# Patient Record
Sex: FEMALE | Race: BLACK OR AFRICAN AMERICAN | Employment: UNEMPLOYED | URBAN - METROPOLITAN AREA
[De-identification: names, ages, dates, MRNs, and addresses within clinical notes are randomized per-mention and may not be internally consistent; named-entity substitution may affect disease eponyms.]

---

## 2022-03-14 ENCOUNTER — OFFICE VISIT (OUTPATIENT)
Dept: HEMATOLOGY | Age: 67
End: 2022-03-14

## 2022-03-14 ENCOUNTER — HOSPITAL ENCOUNTER (OUTPATIENT)
Dept: LAB | Age: 67
Discharge: HOME | End: 2022-03-14
Payer: MEDICARE

## 2022-03-14 VITALS
BODY MASS INDEX: 13.71 KG/M2 | OXYGEN SATURATION: 98 % | HEART RATE: 87 BPM | HEIGHT: 62 IN | TEMPERATURE: 97.8 F | WEIGHT: 74.5 LBS | SYSTOLIC BLOOD PRESSURE: 190 MMHG | DIASTOLIC BLOOD PRESSURE: 87 MMHG

## 2022-03-14 DIAGNOSIS — Z11.59 ENCOUNTER FOR SCREENING FOR OTHER VIRAL DISEASES: ICD-10-CM

## 2022-03-14 DIAGNOSIS — R76.8 HEPATITIS C ANTIBODY POSITIVE IN BLOOD: ICD-10-CM

## 2022-03-14 DIAGNOSIS — K76.9 LIVER DISEASE, UNSPECIFIED: ICD-10-CM

## 2022-03-14 DIAGNOSIS — R76.8 HEPATITIS C ANTIBODY POSITIVE IN BLOOD: Primary | ICD-10-CM

## 2022-03-14 PROBLEM — I10 HTN (HYPERTENSION): Status: ACTIVE | Noted: 2022-03-14

## 2022-03-14 PROBLEM — E11.9 DIABETES MELLITUS TYPE 2, CONTROLLED (HCC): Status: ACTIVE | Noted: 2022-03-14

## 2022-03-14 PROBLEM — B18.2 HEP C W/O COMA, CHRONIC (HCC): Status: RESOLVED | Noted: 2022-03-14 | Resolved: 2022-03-14

## 2022-03-14 PROBLEM — B18.2 HEP C W/O COMA, CHRONIC (HCC): Status: ACTIVE | Noted: 2022-03-14

## 2022-03-14 LAB
ALBUMIN SERPL-MCNC: 3.3 G/DL (ref 3.4–5)
ALBUMIN/GLOB SERPL: 0.7 {RATIO} (ref 0.8–1.7)
ALP SERPL-CCNC: 138 U/L (ref 45–117)
ALT SERPL-CCNC: 105 U/L (ref 13–56)
ANION GAP SERPL CALC-SCNC: 6 MMOL/L (ref 3–18)
AST SERPL-CCNC: 58 U/L (ref 10–38)
BASOPHILS # BLD: 0 K/UL (ref 0–0.1)
BASOPHILS NFR BLD: 1 % (ref 0–2)
BILIRUB DIRECT SERPL-MCNC: 0.3 MG/DL (ref 0–0.2)
BILIRUB SERPL-MCNC: 0.5 MG/DL (ref 0.2–1)
BUN SERPL-MCNC: 11 MG/DL (ref 7–18)
BUN/CREAT SERPL: 19 (ref 12–20)
CALCIUM SERPL-MCNC: 8.7 MG/DL (ref 8.5–10.1)
CHLORIDE SERPL-SCNC: 100 MMOL/L (ref 100–111)
CO2 SERPL-SCNC: 27 MMOL/L (ref 21–32)
CREAT SERPL-MCNC: 0.57 MG/DL (ref 0.6–1.3)
DIFFERENTIAL METHOD BLD: ABNORMAL
EOSINOPHIL # BLD: 0 K/UL (ref 0–0.4)
EOSINOPHIL NFR BLD: 0 % (ref 0–5)
ERYTHROCYTE [DISTWIDTH] IN BLOOD BY AUTOMATED COUNT: 12.3 % (ref 11.6–14.5)
GLOBULIN SER CALC-MCNC: 4.6 G/DL (ref 2–4)
GLUCOSE SERPL-MCNC: 338 MG/DL (ref 74–99)
HCT VFR BLD AUTO: 38.6 % (ref 35–45)
HGB BLD-MCNC: 12.9 G/DL (ref 12–16)
IMM GRANULOCYTES # BLD AUTO: 0 K/UL (ref 0–0.04)
IMM GRANULOCYTES NFR BLD AUTO: 0 % (ref 0–0.5)
INR PPP: 1 (ref 0.8–1.2)
LYMPHOCYTES # BLD: 1.6 K/UL (ref 0.9–3.6)
LYMPHOCYTES NFR BLD: 41 % (ref 21–52)
MCH RBC QN AUTO: 30.4 PG (ref 24–34)
MCHC RBC AUTO-ENTMCNC: 33.4 G/DL (ref 31–37)
MCV RBC AUTO: 90.8 FL (ref 78–100)
MONOCYTES # BLD: 0.2 K/UL (ref 0.05–1.2)
MONOCYTES NFR BLD: 6 % (ref 3–10)
NEUTS SEG # BLD: 2 K/UL (ref 1.8–8)
NEUTS SEG NFR BLD: 52 % (ref 40–73)
NRBC # BLD: 0 K/UL (ref 0–0.01)
NRBC BLD-RTO: 0 PER 100 WBC
PLATELET # BLD AUTO: 238 K/UL (ref 135–420)
PMV BLD AUTO: 10.3 FL (ref 9.2–11.8)
POTASSIUM SERPL-SCNC: 3.8 MMOL/L (ref 3.5–5.5)
PROT SERPL-MCNC: 7.9 G/DL (ref 6.4–8.2)
PROTHROMBIN TIME: 12.4 SEC (ref 11.5–15.2)
RBC # BLD AUTO: 4.25 M/UL (ref 4.2–5.3)
SODIUM SERPL-SCNC: 133 MMOL/L (ref 136–145)
WBC # BLD AUTO: 3.8 K/UL (ref 4.6–13.2)

## 2022-03-14 PROCEDURE — 1101F PT FALLS ASSESS-DOCD LE1/YR: CPT | Performed by: NURSE PRACTITIONER

## 2022-03-14 PROCEDURE — 85610 PROTHROMBIN TIME: CPT

## 2022-03-14 PROCEDURE — 87902 NFCT AGT GNTYP ALYS HEP C: CPT

## 2022-03-14 PROCEDURE — G8432 DEP SCR NOT DOC, RNG: HCPCS | Performed by: NURSE PRACTITIONER

## 2022-03-14 PROCEDURE — G8536 NO DOC ELDER MAL SCRN: HCPCS | Performed by: NURSE PRACTITIONER

## 2022-03-14 PROCEDURE — G8753 SYS BP > OR = 140: HCPCS | Performed by: NURSE PRACTITIONER

## 2022-03-14 PROCEDURE — 82107 ALPHA-FETOPROTEIN L3: CPT

## 2022-03-14 PROCEDURE — 36415 COLL VENOUS BLD VENIPUNCTURE: CPT

## 2022-03-14 PROCEDURE — 85025 COMPLETE CBC W/AUTO DIFF WBC: CPT

## 2022-03-14 PROCEDURE — 86708 HEPATITIS A ANTIBODY: CPT

## 2022-03-14 PROCEDURE — G8754 DIAS BP LESS 90: HCPCS | Performed by: NURSE PRACTITIONER

## 2022-03-14 PROCEDURE — 80048 BASIC METABOLIC PNL TOTAL CA: CPT

## 2022-03-14 PROCEDURE — G8400 PT W/DXA NO RESULTS DOC: HCPCS | Performed by: NURSE PRACTITIONER

## 2022-03-14 PROCEDURE — 99204 OFFICE O/P NEW MOD 45 MIN: CPT | Performed by: NURSE PRACTITIONER

## 2022-03-14 PROCEDURE — G8427 DOCREV CUR MEDS BY ELIG CLIN: HCPCS | Performed by: NURSE PRACTITIONER

## 2022-03-14 PROCEDURE — 87521 HEPATITIS C PROBE&RVRS TRNSC: CPT

## 2022-03-14 PROCEDURE — 80076 HEPATIC FUNCTION PANEL: CPT

## 2022-03-14 PROCEDURE — 87389 HIV-1 AG W/HIV-1&-2 AB AG IA: CPT

## 2022-03-14 PROCEDURE — 1090F PRES/ABSN URINE INCON ASSESS: CPT | Performed by: NURSE PRACTITIONER

## 2022-03-14 PROCEDURE — G8419 CALC BMI OUT NRM PARAM NOF/U: HCPCS | Performed by: NURSE PRACTITIONER

## 2022-03-14 PROCEDURE — 86706 HEP B SURFACE ANTIBODY: CPT

## 2022-03-14 PROCEDURE — 86704 HEP B CORE ANTIBODY TOTAL: CPT

## 2022-03-14 PROCEDURE — 3017F COLORECTAL CA SCREEN DOC REV: CPT | Performed by: NURSE PRACTITIONER

## 2022-03-14 RX ORDER — PROPRANOLOL HYDROCHLORIDE 10 MG/1
10 TABLET ORAL 3 TIMES DAILY
COMMUNITY
End: 2022-08-11

## 2022-03-14 RX ORDER — INSULIN LISPRO 100 [IU]/ML
INJECTION, SOLUTION INTRAVENOUS; SUBCUTANEOUS
COMMUNITY

## 2022-03-14 RX ORDER — LISINOPRIL 5 MG/1
TABLET ORAL DAILY
COMMUNITY

## 2022-03-14 NOTE — PROGRESS NOTES
[address]40 [facility], MD, [address], [place], [place]      JOSE Ruiz, [facility]     Merle Peterson, [facility]   JOHN Bradshaw, [facility]       Meli Otero [address]    at [address], [address] [address]  22.    [phone]    FAX: [address] [place] [address] Avenue    at [address], [address]    [phone]    FAX: [phone]         Patient Care Team:  Jamal Graff MD as PCP - General (Family Medicine)  Jamal Graff MD as PCP - Health System Attribution Model (Family Medicine)      Problem List  Date Reviewed: 3/14/2022          Codes Class Noted    HTN (hypertension) ICD-10-CM: I10  ICD-9-CM: 401.9  3/14/2022        Diabetes mellitus type 2, controlled ([facility] [address] ICD-10-CM: E11.9  ICD-9-CM: 250.00  3/14/2022        Hepatitis C antibody positive in blood ICD-10-CM: R76.8  ICD-9-CM: 795.79  3/14/2022                The clinicians listed above have asked me to see Roopa Pringle in consultation regarding chronic HCV and its management. No medical records were available for review when the patient was here for the appointment. The patient is a 75 y.o. Black female who was screened for anti-HCV and tested positive during birth cohort screening in 03/2022. 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 There was no history of acute icteric hepatitis at the time of these risk factors. Imaging of the liver was either not performed or the results are not available to me. An assessment of liver fibrosis with biopsy or elastography has not been performed. The patient has never received treatment for chronic HCV.       The patient has the following symptoms which could be attributed to the liver disorder:  Fatigue. The patient completes all daily activities without any functional limitations. The patient has not experienced fevers, chills, shortness of breath, chest pain, pain in the right side over the liver, diffuse abdominal pain, nausea, vomiting, constipation, diarrhrea, dry eyes, dry mouth, arthralgias, myalgias, yellowing of the eyes or skin, itching, dark urine, problems concentrating, swelling of the abdomen, swelling of the lower extremities, hematemesis, or hematochezia. ASSESSMENT AND PLAN:  Chronic HCV   Chronic HCV of unclear severity. The most recent laboratory studies indicate that the liver transaminases are elevated, alkaline phosphatase is elevated, tests of hepatic synthetic and metabolic function are normal, and the platelet count is normal.       Based upon laboratory studies the patient does not appear to have advanced liver disease or cirrhosis. Will perform laboratory testing to monitor liver function and degree of liver injury. Will perform and/or review results of HCV viral load and HCV genotype to define the specific treatment and duration of treatment that will be required. Will perform serologic and virologic studies to assess for other causes of chronic liver disease. Will perform imaging of the liver with ultrasound. The need to perform an assessment of liver fibrosis was discussed with the patient. The Fibroscan can assess liver fibrosis and determine if a patient has advanced fibrosis or cirrhosis without the need for liver biopsy. This will be performed at the next office visit. If the Fibroscan suggests advanced fibrosis then a liver biopsy should be considered. The Fibroscan can be repeated annually or as often as clinically indicated to assess for fibrosis progression and/or regression. Chronic HCV Treatment  The patient has not been treated for HCV. Discussed the treatment alternatives. The SVR/cure rate for HCV now exceeds 97% without significant side effects for most patients with HCV. The specific treatment is dependent upon genotype, viral load and histology. Screening for Hepatocellular Carcinoma  HCC screening: AFP was ordered today and ultrasound will be scheduled. Treatment of other medical problems in patients with chronic liver disease  There are no contraindications for the patient to take most medications that are necessary for treatment of other medical issues. The patient has cirrhrosis and should avoid taking NSAIDs which are associated with a higher rate of developing JOSE. The patient had HE and should avoid taking Benzodiazapines which could exacerbate HE. The patient can take any medications utilized for treatment of DM, statins to treat hypercholesterolemia  Normal doses of acetaminophen, as recommended on the label of the bottle, are not hepatotoxic except in the setting of daily alcohol use, even in patients with cirrhosis and can be utilized for pain. Counseling for alcohol in patients with chronic liver disease  The patient was counseled regarding alcohol consumption and the effect of alcohol on chronic liver disease. The patient does not consume any significant amount of alcohol. Vaccinations   The need for vaccination against viral hepatitis A and B will be assessed with serologic and instituted as appropriate. The patient has not received vaccination for COVID-19 vaccine. The patient does not want to take COVID-19 vaccine. The patient was encouraged to take the COVID-19 vaccine. Routine vaccinations against other bacterial and viral agents can be performed as indicated. Annual flu vaccination should be administered if indicated.     ALLERGIES  Not on File    MEDICATIONS  Current Outpatient Medications   Medication Sig    propranoloL (INDERAL) 10 mg tablet Take 10 mg by mouth three (3) times daily. No current facility-administered medications for this visit. SYSTEM REVIEW NOT RELATED TO LIVER DISEASE OR REVIEWED ABOVE:  Constitution systems: Negative for fever, chills, weight gain, weight loss. Eyes: Negative for visual changes. ENT: Negative for sore throat, painful swallowing. Respiratory: Negative for cough, hemoptysis, SOB. Cardiology: Negative for chest pain, palpitations. GI:  Negative for constipation or diarrhea. : Negative for urinary frequency, dysuria, hematuria, nocturia. Skin: Negative for rash. Hematology: Negative for easy bruising, blood clots. Musculo-skelatal: Negative for back pain, muscle pain, weakness. Neurologic: Negative for headaches, dizziness, vertigo, memory problems not related to HE. Psychology: Negative for anxiety, depression. FAMILY HISTORY:  The father   of \"he just got sick all of the sudden. He  suddenly\". He was about 88. The mother Has/had the following chronic disease(s): DM, HTN, blindness. She is 81 y/o. There is no family history of liver disease. There is no family history of immune disorders. SOCIAL HISTORY:  The patient is . The spouse has not been tested for HCV   The patient has 2 children and 5 grandchildren. The patient has never used tobacco products. The patient consumes alcohol on social occasions never in excess. 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 PHYSICAL EXAMINATION:  Visit Vitals  BP (!) 190/87   Pulse 87   Temp 97.8 °F (36.6 °C) (Tympanic)   Ht 5' 2\" (1.575 m)   Wt 74 lb 8 oz (33.8 kg)   SpO2 98%   BMI 13.63 kg/m²     General: No acute distress. Eyes: Sclera anicteric. ENT: No oral lesions. Thyroid normal.  Nodes: No adenopathy. Skin: No spider angiomata. No jaundice. No palmar erythema. Respiratory: Lungs clear to auscultation. Cardiovascular: Regular heart rate. No murmurs. No JVD. Abdomen: Soft non-tender.   Liver size normal to percussion/palpation. Spleen not palpable. No obvious ascites. Extremities: No edema. No muscle wasting. No gross arthritic changes. Neurologic: Alert and oriented. Cranial nerves grossly intact. No asterixis. LABORATORY STUDIES:  From 03/2022:  AST/ ALT/ ALP/ T. BILI/ ALB: 57/ 87/ 121/ 0.7/ 4.1  BUN/ CRT/ PLT:  10/ 0.48/ 227,000    SEROLOGIES:  Not available or performed. Testing was performed today. LIVER HISTOLOGY:  Not available or performed    ENDOSCOPIC PROCEDURES:  Not available or performed    RADIOLOGY:  Not available or performed    OTHER TESTING:  Not available or performed    FOLLOW-UP:  All of the issues listed above in the Assessment and Plan were discussed with the patient. All questions were answered. The patient expressed a clear understanding of the above. [address] in 4 weeks for Fibroscan and to initiate HCV treatment.       LOLLY Caledron-C  Liver [place] of [address], [address] [place] [address] Willie Adelaida Quintero, [address] [place] [address]   [phone]

## 2022-03-15 LAB
AFP L3 MFR SERPL: 3.5 % (ref 0–9.9)
AFP SERPL-MCNC: 10.6 NG/ML (ref 0–9.2)
HAV AB SER QL IA: NEGATIVE
HBV CORE AB SERPL QL IA: NEGATIVE
HBV SURFACE AB SER QL IA: NEGATIVE
HBV SURFACE AB SERPL IA-ACNC: <3.1 MIU/ML
HEP BS AB COMMENT,HBSAC: ABNORMAL

## 2022-03-16 LAB
HCV GENTYP SERPL NAA+PROBE: NORMAL
HIV 1+2 AB+HIV1 P24 AG SERPL QL IA: NONREACTIVE
HIV12 RESULT COMMENT, HHIVC: NORMAL
PLEASE NOTE, 550474: NORMAL

## 2022-03-18 PROBLEM — R76.8 HEPATITIS C ANTIBODY POSITIVE IN BLOOD: Status: ACTIVE | Noted: 2022-03-14

## 2022-03-18 PROBLEM — E11.9 DIABETES MELLITUS TYPE 2, CONTROLLED (HCC): Status: ACTIVE | Noted: 2022-03-14

## 2022-03-18 PROBLEM — I10 HTN (HYPERTENSION): Status: ACTIVE | Noted: 2022-03-14

## 2022-03-18 LAB
HCV RNA SERPL NAA+PROBE-LOG IU: 6.74 HCV LOG 10IU/ML
HCV RNA SERPL PROBE AMP-ACNC: ABNORMAL HCVIU/ML
HCV RNA SERPL QL NAA+PROBE: POSITIVE

## 2022-03-29 ENCOUNTER — HOSPITAL ENCOUNTER (OUTPATIENT)
Dept: ULTRASOUND IMAGING | Age: 67
Discharge: HOME | End: 2022-03-29
Payer: MEDICARE

## 2022-03-29 DIAGNOSIS — R76.8 HEPATITIS C ANTIBODY POSITIVE IN BLOOD: ICD-10-CM

## 2022-03-29 PROCEDURE — 76705 ECHO EXAM OF ABDOMEN: CPT

## 2022-04-13 ENCOUNTER — TELEPHONE (OUTPATIENT)
Dept: HEMATOLOGY | Age: 67
End: 2022-04-13

## 2022-04-13 NOTE — PROGRESS NOTES
Please let her know that her ultrasound is unremarkable. No hepatic masses. She has some mobile gallstones, but no gallbladder inflammation. Thank you.

## 2022-04-13 NOTE — TELEPHONE ENCOUNTER
----- Message from Vahe Darden NP sent at 4/13/2022  8:20 AM EDT -----  Please let her know that her ultrasound is unremarkable. No hepatic masses. She has some mobile gallstones, but no gallbladder inflammation. Thank you.

## 2022-05-17 ENCOUNTER — HOSPITAL ENCOUNTER (OUTPATIENT)
Dept: LAB | Age: 67
Discharge: HOME | End: 2022-05-17
Payer: MEDICARE

## 2022-05-17 ENCOUNTER — OFFICE VISIT (OUTPATIENT)
Dept: HEMATOLOGY | Age: 67
End: 2022-05-17
Payer: MEDICARE

## 2022-05-17 VITALS
HEART RATE: 100 BPM | OXYGEN SATURATION: 99 % | SYSTOLIC BLOOD PRESSURE: 139 MMHG | HEIGHT: 62 IN | TEMPERATURE: 97 F | RESPIRATION RATE: 25 BRPM | BODY MASS INDEX: 14.91 KG/M2 | WEIGHT: 81 LBS | DIASTOLIC BLOOD PRESSURE: 65 MMHG

## 2022-05-17 DIAGNOSIS — B18.2 HEP C W/O COMA, CHRONIC (HCC): Primary | ICD-10-CM

## 2022-05-17 DIAGNOSIS — B18.2 HEP C W/O COMA, CHRONIC (HCC): ICD-10-CM

## 2022-05-17 PROBLEM — R76.8 HEPATITIS C ANTIBODY POSITIVE IN BLOOD: Status: RESOLVED | Noted: 2022-03-14 | Resolved: 2022-05-17

## 2022-05-17 LAB
ALBUMIN SERPL-MCNC: 3.2 G/DL (ref 3.4–5)
ALBUMIN/GLOB SERPL: 0.7 {RATIO} (ref 0.8–1.7)
ALP SERPL-CCNC: 228 U/L (ref 45–117)
ALT SERPL-CCNC: 135 U/L (ref 13–56)
ANION GAP SERPL CALC-SCNC: 7 MMOL/L (ref 3–18)
AST SERPL-CCNC: 79 U/L (ref 10–38)
BASOPHILS # BLD: 0 K/UL (ref 0–0.1)
BASOPHILS NFR BLD: 1 % (ref 0–2)
BILIRUB DIRECT SERPL-MCNC: 0.4 MG/DL (ref 0–0.2)
BILIRUB SERPL-MCNC: 0.6 MG/DL (ref 0.2–1)
BUN SERPL-MCNC: 16 MG/DL (ref 7–18)
BUN/CREAT SERPL: 25 (ref 12–20)
CALCIUM SERPL-MCNC: 8.9 MG/DL (ref 8.5–10.1)
CHLORIDE SERPL-SCNC: 102 MMOL/L (ref 100–111)
CO2 SERPL-SCNC: 27 MMOL/L (ref 21–32)
CREAT SERPL-MCNC: 0.65 MG/DL (ref 0.6–1.3)
DIFFERENTIAL METHOD BLD: ABNORMAL
EOSINOPHIL # BLD: 0 K/UL (ref 0–0.4)
EOSINOPHIL NFR BLD: 0 % (ref 0–5)
ERYTHROCYTE [DISTWIDTH] IN BLOOD BY AUTOMATED COUNT: 12.4 % (ref 11.6–14.5)
GLOBULIN SER CALC-MCNC: 4.8 G/DL (ref 2–4)
GLUCOSE SERPL-MCNC: 322 MG/DL (ref 74–99)
HCT VFR BLD AUTO: 40.5 % (ref 35–45)
HGB BLD-MCNC: 13.5 G/DL (ref 12–16)
IMM GRANULOCYTES # BLD AUTO: 0 K/UL (ref 0–0.04)
IMM GRANULOCYTES NFR BLD AUTO: 0 % (ref 0–0.5)
LYMPHOCYTES # BLD: 1.9 K/UL (ref 0.9–3.6)
LYMPHOCYTES NFR BLD: 51 % (ref 21–52)
MCH RBC QN AUTO: 30.1 PG (ref 24–34)
MCHC RBC AUTO-ENTMCNC: 33.3 G/DL (ref 31–37)
MCV RBC AUTO: 90.2 FL (ref 78–100)
MONOCYTES # BLD: 0.2 K/UL (ref 0.05–1.2)
MONOCYTES NFR BLD: 6 % (ref 3–10)
NEUTS SEG # BLD: 1.6 K/UL (ref 1.8–8)
NEUTS SEG NFR BLD: 42 % (ref 40–73)
NRBC # BLD: 0 K/UL (ref 0–0.01)
NRBC BLD-RTO: 0 PER 100 WBC
PLATELET # BLD AUTO: 232 K/UL (ref 135–420)
PMV BLD AUTO: 10.6 FL (ref 9.2–11.8)
POTASSIUM SERPL-SCNC: 3.4 MMOL/L (ref 3.5–5.5)
PROT SERPL-MCNC: 8 G/DL (ref 6.4–8.2)
RBC # BLD AUTO: 4.49 M/UL (ref 4.2–5.3)
SODIUM SERPL-SCNC: 136 MMOL/L (ref 136–145)
WBC # BLD AUTO: 3.8 K/UL (ref 4.6–13.2)

## 2022-05-17 PROCEDURE — G8536 NO DOC ELDER MAL SCRN: HCPCS | Performed by: NURSE PRACTITIONER

## 2022-05-17 PROCEDURE — 80076 HEPATIC FUNCTION PANEL: CPT

## 2022-05-17 PROCEDURE — 1101F PT FALLS ASSESS-DOCD LE1/YR: CPT | Performed by: NURSE PRACTITIONER

## 2022-05-17 PROCEDURE — G8752 SYS BP LESS 140: HCPCS | Performed by: NURSE PRACTITIONER

## 2022-05-17 PROCEDURE — G8754 DIAS BP LESS 90: HCPCS | Performed by: NURSE PRACTITIONER

## 2022-05-17 PROCEDURE — 3017F COLORECTAL CA SCREEN DOC REV: CPT | Performed by: NURSE PRACTITIONER

## 2022-05-17 PROCEDURE — G8419 CALC BMI OUT NRM PARAM NOF/U: HCPCS | Performed by: NURSE PRACTITIONER

## 2022-05-17 PROCEDURE — 1090F PRES/ABSN URINE INCON ASSESS: CPT | Performed by: NURSE PRACTITIONER

## 2022-05-17 PROCEDURE — 99214 OFFICE O/P EST MOD 30 MIN: CPT | Performed by: NURSE PRACTITIONER

## 2022-05-17 PROCEDURE — G8400 PT W/DXA NO RESULTS DOC: HCPCS | Performed by: NURSE PRACTITIONER

## 2022-05-17 PROCEDURE — G0463 HOSPITAL OUTPT CLINIC VISIT: HCPCS | Performed by: NURSE PRACTITIONER

## 2022-05-17 PROCEDURE — 36415 COLL VENOUS BLD VENIPUNCTURE: CPT

## 2022-05-17 PROCEDURE — G8432 DEP SCR NOT DOC, RNG: HCPCS | Performed by: NURSE PRACTITIONER

## 2022-05-17 PROCEDURE — 91200 LIVER ELASTOGRAPHY: CPT | Performed by: NURSE PRACTITIONER

## 2022-05-17 PROCEDURE — G8427 DOCREV CUR MEDS BY ELIG CLIN: HCPCS | Performed by: NURSE PRACTITIONER

## 2022-05-17 PROCEDURE — 80048 BASIC METABOLIC PNL TOTAL CA: CPT

## 2022-05-17 PROCEDURE — 85025 COMPLETE CBC W/AUTO DIFF WBC: CPT

## 2022-05-17 RX ORDER — GLECAPREVIR AND PIBRENTASVIR 40; 100 MG/1; MG/1
3 TABLET, FILM COATED ORAL DAILY
Qty: 84 TABLET | Refills: 1 | Status: SHIPPED | OUTPATIENT
Start: 2022-05-17 | End: 2022-05-25 | Stop reason: SDUPTHER

## 2022-05-17 RX ORDER — ROSUVASTATIN CALCIUM 10 MG/1
10 TABLET, COATED ORAL
Qty: 56 TABLET | Refills: 0 | Status: SHIPPED | OUTPATIENT
Start: 2022-05-17 | End: 2022-08-11 | Stop reason: ALTCHOICE

## 2022-05-17 RX ORDER — ROSUVASTATIN CALCIUM 20 MG/1
20 TABLET, COATED ORAL
COMMUNITY

## 2022-05-17 RX ORDER — INSULIN GLARGINE 100 [IU]/ML
INJECTION, SOLUTION SUBCUTANEOUS
COMMUNITY

## 2022-05-17 NOTE — PROGRESS NOTES
3340 [facility], MD, [address], [place], [place]      JOSE Davis, [facility]     Merle Peterson, [facility]   LOLLY Sepulveda-SERG Huitron, [facility]       Meli Otero [address]    at [address], [address] [address]  22.    [phone]    FAX: [address] [place] [address]    at [address], [address]    [phone]    FAX: [phone]       Patient Care Team:  Zoe Dumont MD as PCP - General (Family Medicine)      Problem List  Date Reviewed: 5/17/2022          Codes Class Noted    Chronic hepatitis C without hepatic coma ([facility] [address] ICD-10-CM: B18.2  ICD-9-CM: 070.54  5/17/2022        HTN (hypertension) ICD-10-CM: I10  ICD-9-CM: 401.9  3/14/2022        Diabetes mellitus type 2, controlled ([facility] [address] ICD-10-CM: E11.9  ICD-9-CM: 250.00  3/14/2022              Lisa Lewis returns to the [facility] today for fibroscan analysis of hepatic fibrosis and for education and management of chronic HCV. The active problem list, all pertinent past medical history, medications, liver histology, endoscopic studies, radiologic findings and laboratory findings related to the liver disorder were reviewed with the patient. The patient is a 75 y.o. Black female who was screened for anti-HCV and tested positive during birth cohort screening in 03/2022. 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 There was no history of acute icteric hepatitis at the time of these risk factors. Imaging of the liver was performed with ultrasound in 03/2022. Somewhat coarsened echotexture to the liver. An assessment of liver fibrosis with fibroscan was performed during this office visit. Results of the scan indicate a cirrhotic liver. Neither labs nor imaging suggest cirrhosis. The patient has never received treatment for chronic HCV. The patient has the following symptoms which could be attributed to the liver disorder:  Fatigue. The patient completes all daily activities without any functional limitations. The patient has not experienced fevers, chills, shortness of breath, chest pain, pain in the right side over the liver, diffuse abdominal pain, nausea, vomiting, constipation, diarrhrea, dry eyes, dry mouth, arthralgias, myalgias, yellowing of the eyes or skin, itching, dark urine, problems concentrating, swelling of the abdomen, swelling of the lower extremities, hematemesis, or hematochezia. ASSESSMENT AND PLAN:  Chronic HCV   Chronic HCV of unclear severity. Fibroscan suggests cirrhosis. Neither labs nor imaging indicate cirrhosis. The most recent laboratory studies indicate that the liver transaminases are elevated, alkaline phosphatase is elevated, tests of hepatic synthetic and metabolic function are normal, and the platelet count is normal.       Serologic evaluation was negative for all causes of chronic liver disease. The need to perform an assessment of liver fibrosis was discussed with the patient. The Fibroscan can assess liver fibrosis and determine if a patient has advanced fibrosis or cirrhosis without the need for liver biopsy. This was performed during this appointment. Results of the scan indicate a cirrhotic liver. Neither labs nor imaging suggest cirrhosis. The Fibroscan can be repeated annually or as often as clinically indicated to assess for fibrosis progression and/or regression. Chronic HCV Treatment  The patient is treatment naive and has genotype 1A. The patient should be treated with Mavyret for 8 weeks.     Woo Spikes is a rabago genotypic treatment regime utilizing glecaprevir (an NS3/4A protease inhibitor) and pibrentasvir (an NS5A inhibitor) for 8 weeks. I explained that she is to take three tabs daily in a single dose with food. This treatment regime was explained in detail, including dosing and side effects. Educational material was provided. Carmel Lopez was ordered through [facility] in [place]. Screening for Hepatocellular Carcinoma  HCC screening was recently completed. No indications of emerging HCV. Treatment of other medical problems in patients with chronic liver disease  There are no contraindications for the patient to take most medications that are necessary for treatment of other medical issues. The patient has cirrhrosis and should avoid taking NSAIDs which are associated with a higher rate of developing JOSE. The patient had HE and should avoid taking Benzodiazapines which could exacerbate HE. The patient can take any medications utilized for treatment of DM, statins to treat hypercholesterolemia  Normal doses of acetaminophen, as recommended on the label of the bottle, are not hepatotoxic except in the setting of daily alcohol use, even in patients with cirrhosis and can be utilized for pain. Counseling for alcohol in patients with chronic liver disease  The patient was counseled regarding alcohol consumption and the effect of alcohol on chronic liver disease. The patient does not consume any significant amount of alcohol. Vaccinations   The need for vaccination against viral hepatitis A and B will be assessed with serologic and instituted as appropriate. The patient has not received vaccination for COVID-19 vaccine. The patient does not want to take COVID-19 vaccine. The patient was encouraged to take the COVID-19 vaccine. Routine vaccinations against other bacterial and viral agents can be performed as indicated. Annual flu vaccination should be administered if indicated.     ALLERGIES  No Known Allergies    MEDICATIONS  Current Outpatient Medications   Medication Sig    rosuvastatin (CRESTOR) 20 mg tablet Take 20 mg by mouth nightly.  insulin glargine (Lantus U-100 Insulin) 100 unit/mL injection by SubCUTAneous route nightly.  propranoloL (INDERAL) 10 mg tablet Take 10 mg by mouth three (3) times daily.  insulin lispro (HUMALOG) 100 unit/mL injection by SubCUTAneous route.  lisinopriL (PRINIVIL, ZESTRIL) 5 mg tablet Take  by mouth daily. No current facility-administered medications for this visit. SYSTEM REVIEW NOT RELATED TO LIVER DISEASE OR REVIEWED ABOVE:  Constitution systems: Negative for fever, chills, weight gain, weight loss. Eyes: Negative for visual changes. ENT: Negative for sore throat, painful swallowing. Respiratory: Negative for cough, hemoptysis, SOB. Cardiology: Negative for chest pain, palpitations. GI:  Negative for constipation or diarrhea. : Negative for urinary frequency, dysuria, hematuria, nocturia. Skin: Negative for rash. Hematology: Negative for easy bruising, blood clots. Musculo-skelatal: Negative for back pain, muscle pain, weakness. Neurologic: Negative for headaches, dizziness, vertigo, memory problems not related to HE. Psychology: Negative for anxiety, depression. FAMILY HISTORY:  The father   of \"he just got sick all of the sudden. He  suddenly\". He was about 88. The mother Has/had the following chronic disease(s): DM, HTN, blindness. She is 79 y/o. There is no family history of liver disease. There is no family history of immune disorders. SOCIAL HISTORY:  The patient is . The spouse has not been tested for HCV   The patient has 2 children and 5 grandchildren. The patient has never used tobacco products. The patient consumes alcohol on social occasions never in excess. 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       PHYSICAL EXAMINATION:  Visit Vitals  /65   Pulse 100   Temp 97 °F (36.1 °C)   Resp 25   Ht 5' 2\" (1.575 m)   Wt 81 lb (36.7 kg)   SpO2 99%   BMI 14.82 kg/m²     General: No acute distress. Eyes: Sclera anicteric. ENT: No oral lesions. Thyroid normal.  Nodes: No adenopathy. Skin: No spider angiomata. No jaundice. No palmar erythema. Respiratory: Lungs clear to auscultation. Cardiovascular: Regular heart rate. No murmurs. No JVD. Abdomen: Soft non-tender. Liver size normal to percussion/palpation. Spleen not palpable. No obvious ascites. Extremities: No edema. No muscle wasting. No gross arthritic changes. Neurologic: Alert and oriented. Cranial nerves grossly intact. No asterixis. LABORATORY STUDIES:  Liver [place] [address] Units 5/17/2022 3/14/2022   WBC 4.6 - 13.2 K/uL 3.8 (L) 3.8 (L)   ANC 1.8 - 8.0 K/UL 1.6 (L) 2.0   HGB 12.0 - 16.0 g/dL 13.5 12.9    - 420 K/uL 232 238   INR 0.8 - 1.2    1.0   AST 10 - 38 U/L 79 (H) 58 (H)   ALT 13 - 56 U/L 135 (H) 105 (H)   Alk Phos 45 - 117 U/L 228 (H) 138 (H)   Bili, Total 0.2 - 1.0 MG/DL 0.6 0.5   Bili, Direct 0.0 - 0.2 MG/DL 0.4 (H) 0.3 (H)   Albumin 3.4 - 5.0 g/dL 3.2 (L) 3.3 (L)   BUN 7.0 - 18 MG/DL 16 11   Creat 0.6 - 1.3 MG/DL 0.65 0.57 (L)   Na 136 - 145 mmol/L 136 133 (L)   K 3.5 - 5.5 mmol/L 3.4 (L) 3.8   Cl 100 - 111 mmol/L 102 100   CO2 21 - 32 mmol/L 27 27   Glucose 74 - 99 mg/dL 322 (H) 338 (H)     SEROLOGIES:  Serologies Latest Ref Rng & Units 3/14/2022   Hep A Ab, Total Negative   Negative   Hep B Core Ab, Total Negative   Negative   Hep B Surface Ab >10.0 mIU/mL <3.10 (L)   Hep B Surface Ab Interp POS   Negative (A)   Hep C Genotype  1a       LIVER HISTOLOGY:  05/2022. FibroScan performed at [address]. EkPa was 20.1. IQR/med 8%. . The results suggested a fibrosis level of F4. The CAP score suggests there is no significant hepatic steatosis. ENDOSCOPIC PROCEDURES:  Not available or performed    RADIOLOGY:  03/2022. Ultrasound of the liver. Slightly coarsened echotexture.   No focal lesions are seen within limits of study. OTHER TESTING:  Not available or performed    FOLLOW-UP:  All of the issues listed above in the Assessment and Plan were discussed with the patient. All questions were answered. The patient expressed a clear understanding of the above. [address] in 8 weeks to assess response to antiviral therapy.         MANOJ CelisP-C  Liver [place] [address], [address] [place] [address] Patricia Boucher, [address] [place] [address]   [phone]

## 2022-05-25 RX ORDER — GLECAPREVIR AND PIBRENTASVIR 40; 100 MG/1; MG/1
3 TABLET, FILM COATED ORAL DAILY
Qty: 84 TABLET | Refills: 1 | Status: SHIPPED | OUTPATIENT
Start: 2022-05-25 | End: 2022-07-20

## 2022-08-11 ENCOUNTER — OFFICE VISIT (OUTPATIENT)
Dept: HEMATOLOGY | Age: 67
End: 2022-08-11
Payer: MEDICARE

## 2022-08-11 ENCOUNTER — HOSPITAL ENCOUNTER (OUTPATIENT)
Dept: LAB | Age: 67
Discharge: HOME | End: 2022-08-11
Payer: MEDICARE

## 2022-08-11 VITALS
DIASTOLIC BLOOD PRESSURE: 78 MMHG | SYSTOLIC BLOOD PRESSURE: 165 MMHG | TEMPERATURE: 97 F | HEART RATE: 92 BPM | HEIGHT: 62 IN | OXYGEN SATURATION: 98 % | WEIGHT: 84 LBS | BODY MASS INDEX: 15.46 KG/M2

## 2022-08-11 DIAGNOSIS — B18.2 CHRONIC HEPATITIS C WITHOUT HEPATIC COMA (HCC): ICD-10-CM

## 2022-08-11 DIAGNOSIS — B18.2 CHRONIC HEPATITIS C WITHOUT HEPATIC COMA (HCC): Primary | ICD-10-CM

## 2022-08-11 LAB
ALBUMIN SERPL-MCNC: 3.5 G/DL (ref 3.4–5)
ALBUMIN/GLOB SERPL: 0.8 {RATIO} (ref 0.8–1.7)
ALP SERPL-CCNC: 124 U/L (ref 45–117)
ALT SERPL-CCNC: 65 U/L (ref 13–56)
ANION GAP SERPL CALC-SCNC: 7 MMOL/L (ref 3–18)
AST SERPL-CCNC: 37 U/L (ref 10–38)
BASOPHILS # BLD: 0 K/UL (ref 0–0.1)
BASOPHILS NFR BLD: 1 % (ref 0–2)
BILIRUB DIRECT SERPL-MCNC: 0.2 MG/DL (ref 0–0.2)
BILIRUB SERPL-MCNC: 0.3 MG/DL (ref 0.2–1)
BUN SERPL-MCNC: 14 MG/DL (ref 7–18)
BUN/CREAT SERPL: 23 (ref 12–20)
CALCIUM SERPL-MCNC: 8.7 MG/DL (ref 8.5–10.1)
CHLORIDE SERPL-SCNC: 104 MMOL/L (ref 100–111)
CO2 SERPL-SCNC: 25 MMOL/L (ref 21–32)
CREAT SERPL-MCNC: 0.61 MG/DL (ref 0.6–1.3)
DIFFERENTIAL METHOD BLD: ABNORMAL
EOSINOPHIL # BLD: 0 K/UL (ref 0–0.4)
EOSINOPHIL NFR BLD: 0 % (ref 0–5)
ERYTHROCYTE [DISTWIDTH] IN BLOOD BY AUTOMATED COUNT: 12.9 % (ref 11.6–14.5)
GLOBULIN SER CALC-MCNC: 4.6 G/DL (ref 2–4)
GLUCOSE SERPL-MCNC: 265 MG/DL (ref 74–99)
HCT VFR BLD AUTO: 36.9 % (ref 35–45)
HGB BLD-MCNC: 12.3 G/DL (ref 12–16)
IMM GRANULOCYTES # BLD AUTO: 0 K/UL (ref 0–0.04)
IMM GRANULOCYTES NFR BLD AUTO: 0 % (ref 0–0.5)
INR PPP: 1 (ref 0.8–1.2)
LYMPHOCYTES # BLD: 1.6 K/UL (ref 0.9–3.6)
LYMPHOCYTES NFR BLD: 38 % (ref 21–52)
MCH RBC QN AUTO: 29.9 PG (ref 24–34)
MCHC RBC AUTO-ENTMCNC: 33.3 G/DL (ref 31–37)
MCV RBC AUTO: 89.6 FL (ref 78–100)
MONOCYTES # BLD: 0.2 K/UL (ref 0.05–1.2)
MONOCYTES NFR BLD: 6 % (ref 3–10)
NEUTS SEG # BLD: 2.3 K/UL (ref 1.8–8)
NEUTS SEG NFR BLD: 56 % (ref 40–73)
NRBC # BLD: 0 K/UL (ref 0–0.01)
NRBC BLD-RTO: 0 PER 100 WBC
PLATELET # BLD AUTO: 208 K/UL (ref 135–420)
PMV BLD AUTO: 10.8 FL (ref 9.2–11.8)
POTASSIUM SERPL-SCNC: 3.6 MMOL/L (ref 3.5–5.5)
PROT SERPL-MCNC: 8.1 G/DL (ref 6.4–8.2)
PROTHROMBIN TIME: 13.3 SEC (ref 11.5–15.2)
RBC # BLD AUTO: 4.12 M/UL (ref 4.2–5.3)
SODIUM SERPL-SCNC: 136 MMOL/L (ref 136–145)
WBC # BLD AUTO: 4.2 K/UL (ref 4.6–13.2)

## 2022-08-11 PROCEDURE — 80048 BASIC METABOLIC PNL TOTAL CA: CPT

## 2022-08-11 PROCEDURE — G8419 CALC BMI OUT NRM PARAM NOF/U: HCPCS | Performed by: NURSE PRACTITIONER

## 2022-08-11 PROCEDURE — 99214 OFFICE O/P EST MOD 30 MIN: CPT | Performed by: NURSE PRACTITIONER

## 2022-08-11 PROCEDURE — G8754 DIAS BP LESS 90: HCPCS | Performed by: NURSE PRACTITIONER

## 2022-08-11 PROCEDURE — G8536 NO DOC ELDER MAL SCRN: HCPCS | Performed by: NURSE PRACTITIONER

## 2022-08-11 PROCEDURE — 85610 PROTHROMBIN TIME: CPT

## 2022-08-11 PROCEDURE — 1101F PT FALLS ASSESS-DOCD LE1/YR: CPT | Performed by: NURSE PRACTITIONER

## 2022-08-11 PROCEDURE — 85025 COMPLETE CBC W/AUTO DIFF WBC: CPT

## 2022-08-11 PROCEDURE — 82107 ALPHA-FETOPROTEIN L3: CPT

## 2022-08-11 PROCEDURE — G0463 HOSPITAL OUTPT CLINIC VISIT: HCPCS | Performed by: NURSE PRACTITIONER

## 2022-08-11 PROCEDURE — 36415 COLL VENOUS BLD VENIPUNCTURE: CPT

## 2022-08-11 PROCEDURE — 1123F ACP DISCUSS/DSCN MKR DOCD: CPT | Performed by: NURSE PRACTITIONER

## 2022-08-11 PROCEDURE — 3017F COLORECTAL CA SCREEN DOC REV: CPT | Performed by: NURSE PRACTITIONER

## 2022-08-11 PROCEDURE — G8427 DOCREV CUR MEDS BY ELIG CLIN: HCPCS | Performed by: NURSE PRACTITIONER

## 2022-08-11 PROCEDURE — 87522 HEPATITIS C REVRS TRNSCRPJ: CPT

## 2022-08-11 PROCEDURE — G8400 PT W/DXA NO RESULTS DOC: HCPCS | Performed by: NURSE PRACTITIONER

## 2022-08-11 PROCEDURE — G8432 DEP SCR NOT DOC, RNG: HCPCS | Performed by: NURSE PRACTITIONER

## 2022-08-11 PROCEDURE — G8753 SYS BP > OR = 140: HCPCS | Performed by: NURSE PRACTITIONER

## 2022-08-11 PROCEDURE — 80076 HEPATIC FUNCTION PANEL: CPT

## 2022-08-11 PROCEDURE — 1090F PRES/ABSN URINE INCON ASSESS: CPT | Performed by: NURSE PRACTITIONER

## 2022-08-11 NOTE — PROGRESS NOTES
[address]40 [facility], [place], [address], [facility], [place]      JOSE Moreno, ACN-BC     Merle Peterson, [facility]-BC   Rip Councilman, FNP-C    Tigist [facility]       Meli Otero [address] 136    at [address], [id number] [place] [address]    [address] [facility], [facility] 22.    [phone]    FAX: [address] [facility] [address]    at [address], [address]    [phone]    FAX: [phone]       Patient Care Team:  Cheryl Espinoza MD as PCP - General (Family Medicine)      Problem List  Date Reviewed: 5/17/2022            Codes Class Noted    Chronic hepatitis C without hepatic coma ([facility] ICD-10-CM: B18.2  ICD-9-CM: 070.54  5/17/2022        HTN (hypertension) ICD-10-CM: I10  ICD-9-CM: 401.9  3/14/2022        Diabetes mellitus type 2, controlled ([facility] ICD-10-CM: E11.9  ICD-9-CM: 250.00  3/14/2022           Iqra Flores returns to the [place] [address] today for education and management of chronic HCV. She completed an 8 weeks course of Karron Amen on 07/31/2022. This is the only time the HCV has ever been treated. The active problem list, all pertinent past medical history, medications, liver histology, endoscopic studies, radiologic findings and laboratory findings related to the liver disorder were reviewed with the patient. The patient is a 75 y.o. Black female who was screened for anti-HCV and tested positive during birth cohort screening in 03/2022. 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 There was no history of acute icteric hepatitis at the time of these risk factors. Imaging of the liver was performed with ultrasound in 03/2022. Somewhat coarsened echotexture to the liver.     An assessment of liver fibrosis with fibroscan was performed during the patient's 05/2022 appointment. Results of the scan indicate a cirrhotic liver. Neither labs nor imaging suggest cirrhosis. The patient has the following symptoms which could be attributed to the liver disorder:  Fatigue. This is much improved. The patient completes all daily activities without any functional limitations. The patient has not experienced fevers, chills, shortness of breath, chest pain, pain in the right side over the liver, diffuse abdominal pain, nausea, vomiting, constipation, diarrhrea, dry eyes, dry mouth, arthralgias, myalgias, yellowing of the eyes or skin, itching, dark urine, problems concentrating, swelling of the abdomen, swelling of the lower extremities, hematemesis, or hematochezia. Since the last office appointment:  Completed 8 weeks of Josefa Bamberger on 07/31/2022. ASSESSMENT AND PLAN:  Chronic HCV   Chronic HCV of unclear severity. Fibroscan suggests cirrhosis. Neither labs nor imaging indicate cirrhosis. The most recent laboratory studies indicate that the AST is normal, the ALT is elevated, the alkaline phosphatase is elevated, tests of hepatic synthetic and metabolic function are normal, and the platelet count is normal.       Serologic evaluation was negative for all causes of chronic liver disease. The need to perform an assessment of liver fibrosis was discussed with the patient. The Fibroscan can assess liver fibrosis and determine if a patient has advanced fibrosis or cirrhosis without the need for liver biopsy. This was performed during the patient's 05/2022 appointment. Results of the scan indicate a cirrhotic liver. Neither labs nor imaging suggest cirrhosis. The Fibroscan can be repeated annually or as often as clinically indicated to assess for fibrosis progression and/or regression. Will repeat the labs when she returns for follow up in 3 months.       Chronic HCV Treatment  The patient is treatment naive and has genotype 1A. The patient was treated with Lucinda Gaye for 8 weeks. She completed the antiviral drug regime on 07/31/2022. She denies missing any doses of the medication. She had treatment related complaints. There is no detectable HCV PCR on today's labs. Will see her back in 12   weeks to assess for SVR 12/cure. Screening for Hepatocellular Carcinoma  HCC screening was recently completed. No indications of emerging HCV. Treatment of other medical problems in patients with chronic liver disease  There are no contraindications for the patient to take most medications that are necessary for treatment of other medical issues. The patient has cirrhrosis and should avoid taking NSAIDs which are associated with a higher rate of developing JOSE. The patient had HE and should avoid taking Benzodiazapines which could exacerbate HE. The patient can take any medications utilized for treatment of DM, statins to treat hypercholesterolemia  Normal doses of acetaminophen, as recommended on the label of the bottle, are not hepatotoxic except in the setting of daily alcohol use, even in patients with cirrhosis and can be utilized for pain. Counseling for alcohol in patients with chronic liver disease  The patient was counseled regarding alcohol consumption and the effect of alcohol on chronic liver disease. The patient does not consume any significant amount of alcohol. Vaccinations   The need for vaccination against viral hepatitis A and B will be assessed with serologic and instituted as appropriate. The patient has not received vaccination for COVID-19 vaccine. The patient does not want to take COVID-19 vaccine. The patient was encouraged to take the COVID-19 vaccine. Routine vaccinations against other bacterial and viral agents can be performed as indicated. Annual flu vaccination should be administered if indicated.     ALLERGIES  No Known Allergies    MEDICATIONS  Current Outpatient Medications   Medication Sig    rosuvastatin (CRESTOR) 20 mg tablet Take 20 mg by mouth nightly. insulin glargine (Lantus U-100 Insulin) 100 unit/mL injection by SubCUTAneous route nightly. rosuvastatin (CRESTOR) 10 mg tablet Take 1 Tablet by mouth nightly. propranoloL (INDERAL) 10 mg tablet Take 10 mg by mouth three (3) times daily. insulin lispro (HUMALOG) 100 unit/mL injection by SubCUTAneous route. lisinopriL (PRINIVIL, ZESTRIL) 5 mg tablet Take  by mouth daily. No current facility-administered medications for this visit. SYSTEM REVIEW NOT RELATED TO LIVER DISEASE OR REVIEWED ABOVE:  Constitution systems: Negative for fever, chills, weight gain, weight loss. Eyes: Negative for visual changes. ENT: Negative for sore throat, painful swallowing. Respiratory: Negative for cough, hemoptysis, SOB. Cardiology: Negative for chest pain, palpitations. GI:  Negative for constipation or diarrhea. : Negative for urinary frequency, dysuria, hematuria, nocturia. Skin: Negative for rash. Hematology: Negative for easy bruising, blood clots. Musculo-skelatal: Negative for back pain, muscle pain, weakness. Neurologic: Negative for headaches, dizziness, vertigo, memory problems not related to HE. Psychology: Negative for anxiety, depression. FAMILY HISTORY:  The father   of \"he just got sick all of the sudden. He  suddenly\". He was about 88. The mother Has/had the following chronic disease(s): DM, HTN, blindness. She is 81 y/o. There is no family history of liver disease. There is no family history of immune disorders. SOCIAL HISTORY:  The patient is . The spouse has not been tested for HCV   The patient has 2 children and 5 grandchildren. The patient has never used tobacco products. The patient consumes alcohol on social occasions never in excess.     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 PHYSICAL EXAMINATION:  Visit Vitals  BP (!) 165/78   Pulse 92   Temp 97 °F (36.1 °C)   Ht 5' 2\" (1.575 m)   Wt 84 lb (38.1 kg)   SpO2 98%   BMI 15.36 kg/m²     General: No acute distress. Eyes: Sclera anicteric. ENT: No oral lesions. Thyroid normal.  Nodes: No adenopathy. Skin: No spider angiomata. No jaundice. No palmar erythema. Respiratory: Lungs clear to auscultation. Cardiovascular: Regular heart rate. No murmurs. No JVD. Abdomen: Soft non-tender. Liver size normal to percussion/palpation. Spleen not palpable. No obvious ascites. Extremities: No edema. No muscle wasting. No gross arthritic changes. Neurologic: Alert and oriented. Cranial nerves grossly intact. No asterixis.     LABORATORY STUDIES:  [address] Units 8/11/2022 5/17/2022   WBC 4.6 - 13.2 K/uL 4.2 (L) 3.8 (L)   ANC 1.8 - 8.0 K/UL 2.3 1.6 (L)   HGB 12.0 - 16.0 g/dL 12.3 13.5    - 420 K/uL 208 232   INR 0.8 - 1.2   1.0    AST 10 - 38 U/L 37 79 (H)   ALT 13 - 56 U/L 65 (H) 135 (H)   Alk Phos 45 - 117 U/L 124 (H) 228 (H)   Bili, Total 0.2 - 1.0 MG/DL 0.3 0.6   Bili, Direct 0.0 - 0.2 MG/DL 0.2 0.4 (H)   Albumin 3.4 - 5.0 g/dL 3.5 3.2 (L)   BUN 7.0 - 18 MG/DL 14 16   Creat 0.6 - 1.3 MG/DL 0.61 0.65   Na 136 - 145 mmol/L 136 136   K 3.5 - 5.5 mmol/L 3.6 3.4 (L)   Cl 100 - 111 mmol/L 104 102   CO2 21 - 32 mmol/L 25 27   Glucose 74 - 99 mg/dL 265 (H) 322 (H)     Liver [facility] Latest Ref Rng & Units 3/14/2022   WBC 4.6 - 13.2 K/uL 3.8 (L)   ANC 1.8 - 8.0 K/UL 2.0   HGB 12.0 - 16.0 g/dL 12.9    - 420 K/uL 238   INR 0.8 - 1.2   1.0   AST 10 - 38 U/L 58 (H)   ALT 13 - 56 U/L 105 (H)   Alk Phos 45 - 117 U/L 138 (H)   Bili, Total 0.2 - 1.0 MG/DL 0.5   Bili, Direct 0.0 - 0.2 MG/DL 0.3 (H)   Albumin 3.4 - 5.0 g/dL 3.3 (L)   BUN 7.0 - 18 MG/DL 11   Creat 0.6 - 1.3 MG/DL 0.57 (L)   Na 136 - 145 mmol/L 133 (L)   K 3.5 - 5.5 mmol/L 3.8   Cl 100 - 111 mmol/L 100 CO2 21 - 32 mmol/L 27   Glucose 74 - 99 mg/dL 338 (H)     SEROLOGIES:  Serologies Latest Ref Rng & Units 3/14/2022   Hep A Ab, Total Negative   Negative   Hep B Core Ab, Total Negative   Negative   Hep B Surface Ab >10.0 mIU/mL <3.10 (L)   Hep B Surface Ab Interp POS   Negative (A)   Hep C Genotype  1a       LIVER HISTOLOGY:  05/2022. FibroScan performed at [address]. EkPa was 20.1. IQR/med 8%. . The results suggested a fibrosis level of F4. The CAP score suggests there is no significant hepatic steatosis. ENDOSCOPIC PROCEDURES:  Not available or performed    RADIOLOGY:  03/2022. Ultrasound of the liver. Slightly coarsened echotexture. No focal lesions are seen within limits of study. OTHER TESTING:  Not available or performed    FOLLOW-UP:  All of the issues listed above in the Assessment and Plan were discussed with the patient. All questions were answered. The patient expressed a clear understanding of the above. [address] in 3 months to assess for SVR 12 and to repeat the fibroscan.       LOLLY Ortega-C  Liver [place] of [address], [address] [place] [address]   [phone]

## 2022-08-12 LAB
HCV RNA SERPL NAA+PROBE-LOG IU: NOT DETECTED HCV LOG 10IU/ML
HCV RNA SERPL PROBE AMP-ACNC: NOT DETECTED HCVIU/ML

## 2022-08-15 LAB
AFP L3 MFR SERPL: NORMAL % (ref 0–9.9)
AFP SERPL-MCNC: 6.3 NG/ML (ref 0–9.2)

## 2022-09-07 ENCOUNTER — HOSPITAL ENCOUNTER (OUTPATIENT)
Dept: ULTRASOUND IMAGING | Age: 67
Discharge: HOME | End: 2022-09-07
Payer: MEDICARE

## 2022-09-07 DIAGNOSIS — B18.2 CHRONIC HEPATITIS C WITHOUT HEPATIC COMA (HCC): ICD-10-CM

## 2022-09-07 PROCEDURE — 76705 ECHO EXAM OF ABDOMEN: CPT

## 2022-09-26 ENCOUNTER — TELEPHONE (OUTPATIENT)
Dept: HEMATOLOGY | Age: 67
End: 2022-09-26

## 2022-09-26 NOTE — TELEPHONE ENCOUNTER
----- Message from Mike Zamora NP sent at 9/22/2022  8:34 AM EDT -----  Please let her know that her ultrasound is unremarkable. No hepatic masses. Thank you.

## 2023-01-11 ENCOUNTER — HOSPITAL ENCOUNTER (OUTPATIENT)
Dept: LAB | Age: 68
Discharge: HOME | End: 2023-01-11
Payer: MEDICARE

## 2023-01-11 ENCOUNTER — OFFICE VISIT (OUTPATIENT)
Dept: HEMATOLOGY | Age: 68
End: 2023-01-11
Payer: MEDICARE

## 2023-01-11 VITALS
DIASTOLIC BLOOD PRESSURE: 80 MMHG | HEART RATE: 88 BPM | TEMPERATURE: 98.4 F | BODY MASS INDEX: 17.3 KG/M2 | WEIGHT: 94 LBS | HEIGHT: 62 IN | SYSTOLIC BLOOD PRESSURE: 165 MMHG | OXYGEN SATURATION: 98 %

## 2023-01-11 DIAGNOSIS — B18.2 CHRONIC HEPATITIS C WITHOUT HEPATIC COMA (HCC): Primary | ICD-10-CM

## 2023-01-11 DIAGNOSIS — B18.2 CHRONIC HEPATITIS C WITHOUT HEPATIC COMA (HCC): ICD-10-CM

## 2023-01-11 PROBLEM — E78.00 HYPERCHOLESTEROLEMIA: Status: ACTIVE | Noted: 2023-01-11

## 2023-01-11 LAB
ALBUMIN SERPL-MCNC: 3.6 G/DL (ref 3.4–5)
ALBUMIN/GLOB SERPL: 0.8 (ref 0.8–1.7)
ALP SERPL-CCNC: 167 U/L (ref 45–117)
ALT SERPL-CCNC: 60 U/L (ref 13–56)
ANION GAP SERPL CALC-SCNC: 9 MMOL/L (ref 3–18)
AST SERPL-CCNC: 32 U/L (ref 10–38)
BASOPHILS # BLD: 0 K/UL (ref 0–0.1)
BASOPHILS NFR BLD: 1 % (ref 0–2)
BILIRUB DIRECT SERPL-MCNC: 0.1 MG/DL (ref 0–0.2)
BILIRUB SERPL-MCNC: 0.3 MG/DL (ref 0.2–1)
BUN SERPL-MCNC: 12 MG/DL (ref 7–18)
BUN/CREAT SERPL: 21 (ref 12–20)
CALCIUM SERPL-MCNC: 9 MG/DL (ref 8.5–10.1)
CHLORIDE SERPL-SCNC: 103 MMOL/L (ref 100–111)
CO2 SERPL-SCNC: 23 MMOL/L (ref 21–32)
CREAT SERPL-MCNC: 0.56 MG/DL (ref 0.6–1.3)
DIFFERENTIAL METHOD BLD: ABNORMAL
EOSINOPHIL # BLD: 0 K/UL (ref 0–0.4)
EOSINOPHIL NFR BLD: 1 % (ref 0–5)
ERYTHROCYTE [DISTWIDTH] IN BLOOD BY AUTOMATED COUNT: 13.9 % (ref 11.6–14.5)
GLOBULIN SER CALC-MCNC: 4.7 G/DL (ref 2–4)
GLUCOSE SERPL-MCNC: 269 MG/DL (ref 74–99)
HCT VFR BLD AUTO: 37.8 % (ref 35–45)
HGB BLD-MCNC: 12 G/DL (ref 12–16)
IMM GRANULOCYTES # BLD AUTO: 0 K/UL (ref 0–0.04)
IMM GRANULOCYTES NFR BLD AUTO: 0 % (ref 0–0.5)
INR PPP: 1 (ref 0.8–1.2)
LYMPHOCYTES # BLD: 1.8 K/UL (ref 0.9–3.6)
LYMPHOCYTES NFR BLD: 42 % (ref 21–52)
MCH RBC QN AUTO: 28.8 PG (ref 24–34)
MCHC RBC AUTO-ENTMCNC: 31.7 G/DL (ref 31–37)
MCV RBC AUTO: 90.9 FL (ref 78–100)
MONOCYTES # BLD: 0.2 K/UL (ref 0.05–1.2)
MONOCYTES NFR BLD: 6 % (ref 3–10)
NEUTS SEG # BLD: 2.1 K/UL (ref 1.8–8)
NEUTS SEG NFR BLD: 51 % (ref 40–73)
NRBC # BLD: 0 K/UL (ref 0–0.01)
NRBC BLD-RTO: 0 PER 100 WBC
PLATELET # BLD AUTO: 182 K/UL (ref 135–420)
PMV BLD AUTO: 10.3 FL (ref 9.2–11.8)
POTASSIUM SERPL-SCNC: 3.5 MMOL/L (ref 3.5–5.5)
PROT SERPL-MCNC: 8.3 G/DL (ref 6.4–8.2)
PROTHROMBIN TIME: 13.8 SEC (ref 11.5–15.2)
RBC # BLD AUTO: 4.16 M/UL (ref 4.2–5.3)
SODIUM SERPL-SCNC: 135 MMOL/L (ref 136–145)
WBC # BLD AUTO: 4.2 K/UL (ref 4.6–13.2)

## 2023-01-11 PROCEDURE — 1123F ACP DISCUSS/DSCN MKR DOCD: CPT | Performed by: NURSE PRACTITIONER

## 2023-01-11 PROCEDURE — 82107 ALPHA-FETOPROTEIN L3: CPT

## 2023-01-11 PROCEDURE — G8419 CALC BMI OUT NRM PARAM NOF/U: HCPCS | Performed by: NURSE PRACTITIONER

## 2023-01-11 PROCEDURE — 1090F PRES/ABSN URINE INCON ASSESS: CPT | Performed by: NURSE PRACTITIONER

## 2023-01-11 PROCEDURE — G8536 NO DOC ELDER MAL SCRN: HCPCS | Performed by: NURSE PRACTITIONER

## 2023-01-11 PROCEDURE — 80076 HEPATIC FUNCTION PANEL: CPT

## 2023-01-11 PROCEDURE — 3079F DIAST BP 80-89 MM HG: CPT | Performed by: NURSE PRACTITIONER

## 2023-01-11 PROCEDURE — 87522 HEPATITIS C REVRS TRNSCRPJ: CPT

## 2023-01-11 PROCEDURE — G0463 HOSPITAL OUTPT CLINIC VISIT: HCPCS | Performed by: NURSE PRACTITIONER

## 2023-01-11 PROCEDURE — 3017F COLORECTAL CA SCREEN DOC REV: CPT | Performed by: NURSE PRACTITIONER

## 2023-01-11 PROCEDURE — G8400 PT W/DXA NO RESULTS DOC: HCPCS | Performed by: NURSE PRACTITIONER

## 2023-01-11 PROCEDURE — 85025 COMPLETE CBC W/AUTO DIFF WBC: CPT

## 2023-01-11 PROCEDURE — G8432 DEP SCR NOT DOC, RNG: HCPCS | Performed by: NURSE PRACTITIONER

## 2023-01-11 PROCEDURE — 1101F PT FALLS ASSESS-DOCD LE1/YR: CPT | Performed by: NURSE PRACTITIONER

## 2023-01-11 PROCEDURE — 85610 PROTHROMBIN TIME: CPT

## 2023-01-11 PROCEDURE — 91200 LIVER ELASTOGRAPHY: CPT | Performed by: NURSE PRACTITIONER

## 2023-01-11 PROCEDURE — G8427 DOCREV CUR MEDS BY ELIG CLIN: HCPCS | Performed by: NURSE PRACTITIONER

## 2023-01-11 PROCEDURE — 36415 COLL VENOUS BLD VENIPUNCTURE: CPT

## 2023-01-11 PROCEDURE — 3077F SYST BP >= 140 MM HG: CPT | Performed by: NURSE PRACTITIONER

## 2023-01-11 PROCEDURE — 80048 BASIC METABOLIC PNL TOTAL CA: CPT

## 2023-01-11 PROCEDURE — 99214 OFFICE O/P EST MOD 30 MIN: CPT | Performed by: NURSE PRACTITIONER

## 2023-01-11 NOTE — PROGRESS NOTES
3340 [facility], [place], [address], [place], [place]      JOSE [place], [facility]-BC     April S Nicholas, [facility]   LOLLY San-SERG Rich, [facility]       Meli Otero [address] 136    at [facility]    [address] [place] [address] Ave, [address] [address] Flores  22.    [phone]    FAX: [address] [place] [address] Avenue    at [address], [address]    [phone]    FAX: [phone]       Patient Care Team:  Carmela Kauffman MD as PCP - General (Family Medicine)      Problem List  Date Reviewed: 8/11/2022            Codes Class Noted    Chronic hepatitis C without hepatic coma ([facility] ICD-10-CM: B18.2  ICD-9-CM: 070.54  5/17/2022        HTN (hypertension) ICD-10-CM: I10  ICD-9-CM: 401.9  3/14/2022        Diabetes mellitus type 2, controlled ([facility] ICD-10-CM: E11.9  ICD-9-CM: 250.00  3/14/2022           Ileana Person returns to the [place] [address] today for fibroscan assessment of hepatic fibrosis and for education and management of chronic HCV. She completed an 8 weeks course of Ricky Biloxi on 07/31/2022. This is the only time the HCV has ever been treated. The active problem list, all pertinent past medical history, medications, liver histology, endoscopic studies, radiologic findings and laboratory findings related to the liver disorder were reviewed with the patient. The patient is a 70 y.o. Black female who was screened for anti-HCV and tested positive during birth cohort screening in 03/2022. 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 There was no history of acute icteric hepatitis at the time of these risk factors. Imaging of the liver was performed with ultrasound in 09/2022.   Coarsened hepatic echotexture. . No focal mass. Consistent with cirrhosis. An assessment of liver fibrosis with fibroscan was performed during the patient's 05/2022 appointment. Results of the scan indicate a cirrhotic liver. Labs do not reflect cirrhosis. The patient has the following symptoms which could be attributed to the liver disorder:  Fatigue. This is much improved. The patient completes all daily activities without any functional limitations. The patient has not experienced fevers, chills, shortness of breath, chest pain, pain in the right side over the liver, diffuse abdominal pain, nausea, vomiting, constipation, diarrhrea, dry eyes, dry mouth, arthralgias, myalgias, yellowing of the eyes or skin, itching, dark urine, problems concentrating, swelling of the abdomen, swelling of the lower extremities, hematemesis, or hematochezia. Since the last office appointment:  Completed 8 weeks of Edmundo Benson on 07/31/2022. ASSESSMENT AND PLAN:  Chronic HCV   Chronic HCV with bridging fibrosis/early cirrhosis. The most recent laboratory studies indicate that the AST is normal, the ALT is elevated, the alkaline phosphatase is elevated, tests of hepatic synthetic and metabolic function are normal, and the platelet count is normal.       Serologic evaluation was negative for all causes of chronic liver disease. Will drawn autoimmune markers including anti-mitochondrial antibody next visit. The need to perform an assessment of liver fibrosis was discussed with the patient. The Fibroscan can assess liver fibrosis and determine if a patient has advanced fibrosis or cirrhosis without the need for liver biopsy. This was performed during the patient's 05/2022 appointment and again today. Results of both scans indicate a cirrhotic liver. Labs do not reflect cirrhosis. The Fibroscan can be repeated annually or as often as clinically indicated to assess for fibrosis progression and/or regression. Chronic HCV Treatment  The patient is treatment naive and has genotype 1A. The patient was treated with Maria Ines Slipper for 8 weeks. She completed the antiviral drug regime on 07/31/2022. She denies missing any doses of the medication. She had treatment related complaints. There is no detectable HCV PCR on labs from 08/2022. Will assess for SVR/cure with today's labs. Screening for Hepatocellular Carcinoma  HCC screening was recently completed. No indications of emerging HCC. Treatment of other medical problems in patients with chronic liver disease  There are no contraindications for the patient to take most medications that are necessary for treatment of other medical issues. The patient has cirrhrosis and should avoid taking NSAIDs which are associated with a higher rate of developing JOSE. The patient had HE and should avoid taking Benzodiazapines which could exacerbate HE. The patient can take any medications utilized for treatment of DM, statins to treat hypercholesterolemia  Normal doses of acetaminophen, as recommended on the label of the bottle, are not hepatotoxic except in the setting of daily alcohol use, even in patients with cirrhosis and can be utilized for pain. Counseling for alcohol in patients with chronic liver disease  The patient was counseled regarding alcohol consumption and the effect of alcohol on chronic liver disease. The patient does not consume any significant amount of alcohol. Vaccinations   Vaccination for viral hepatitis A and B is recommended. The patient does not have serologic evidence of prior exposure or vaccination with immunity. The patient has not received vaccination for COVID-19 vaccine. The patient does not want to take COVID-19 vaccine. The patient was encouraged to take the COVID-19 vaccine. Routine vaccinations against other bacterial and viral agents can be performed as indicated.   Annual flu vaccination should be administered if indicated. ALLERGIES  No Known Allergies    MEDICATIONS  Current Outpatient Medications   Medication Sig    rosuvastatin (CRESTOR) 20 mg tablet Take 20 mg by mouth nightly. insulin glargine (Lantus U-100 Insulin) 100 unit/mL injection by SubCUTAneous route nightly. insulin lispro (HUMALOG) 100 unit/mL injection by SubCUTAneous route. lisinopriL (PRINIVIL, ZESTRIL) 5 mg tablet Take  by mouth daily. No current facility-administered medications for this visit. SYSTEM REVIEW NOT RELATED TO LIVER DISEASE OR REVIEWED ABOVE:  Constitution systems: Negative for fever, chills, weight gain, weight loss. Eyes: Negative for visual changes. ENT: Negative for sore throat, painful swallowing. Respiratory: Negative for cough, hemoptysis, SOB. Cardiology: Negative for chest pain, palpitations. GI:  Negative for constipation or diarrhea. : Negative for urinary frequency, dysuria, hematuria, nocturia. Skin: Negative for rash. Hematology: Negative for easy bruising, blood clots. Musculo-skelatal: Negative for back pain, muscle pain, weakness. Neurologic: Negative for headaches, dizziness, vertigo, memory problems not related to HE. Psychology: Negative for anxiety, depression. FAMILY HISTORY:  The father   of \"he just got sick all of the sudden. He  suddenly\". He was about 88. The mother Has/had the following chronic disease(s): DM, HTN, blindness. She is 79 y/o. There is no family history of liver disease. There is no family history of immune disorders. SOCIAL HISTORY:  The patient is . The spouse has not been tested for HCV   The patient has 2 children and 5 grandchildren. The patient has never used tobacco products. The patient consumes alcohol on social occasions never in excess. 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       PHYSICAL EXAMINATION:  Visit Vitals  BP (!) 165/80   Pulse 88   Temp 98.4 °F (36.9 °C)   Ht 5' 2\" (1.575 m)   Wt 94 lb (42.6 kg)   SpO2 98%   BMI 17.19 kg/m²     General: No acute distress. Eyes: Sclera anicteric. ENT: No oral lesions. Thyroid normal.  Nodes: No adenopathy. Skin: No spider angiomata. No jaundice. No palmar erythema. Respiratory: Lungs clear to auscultation. Cardiovascular: Regular heart rate. No murmurs. No JVD. Abdomen: Soft non-tender. Liver size normal to percussion/palpation. Spleen not palpable. No obvious ascites. Extremities: No edema. No muscle wasting. No gross arthritic changes. Neurologic: Alert and oriented. Cranial nerves grossly intact. No asterixis.     LABORATORY STUDIES:  [address] & Units 1/11/2023 8/11/2022   WBC 4.6 - 13.2 K/uL 4.2 (L) 4.2 (L)   ANC 1.8 - 8.0 K/UL 2.1 2.3   HGB 12.0 - 16.0 g/dL 12.0 12.3    - 420 K/uL 182 208   INR 0.8 - 1.2   1.0 1.0   AST 10 - 38 U/L 32 37   ALT 13 - 56 U/L 60 (H) 65 (H)   Alk Phos 45 - 117 U/L 167 (H) 124 (H)   Bili, Total 0.2 - 1.0 MG/DL 0.3 0.3   Bili, Direct 0.0 - 0.2 MG/DL 0.1 0.2   Albumin 3.4 - 5.0 g/dL 3.6 3.5   BUN 7.0 - 18 MG/DL 12 14   Creat 0.6 - 1.3 MG/DL 0.56 (L) 0.61   Na 136 - 145 mmol/L 135 (L) 136   K 3.5 - 5.5 mmol/L 3.5 3.6   Cl 100 - 111 mmol/L 103 104   CO2 21 - 32 mmol/L 23 25   Glucose 74 - 99 mg/dL 269 (H) 265 (H)     Liver [facility] Latest Ref Rng & Units 5/17/2022   WBC 4.6 - 13.2 K/uL 3.8 (L)   ANC 1.8 - 8.0 K/UL 1.6 (L)   HGB 12.0 - 16.0 g/dL 13.5    - 420 K/uL 232   INR 0.8 - 1.2      AST 10 - 38 U/L 79 (H)   ALT 13 - 56 U/L 135 (H)   Alk Phos 45 - 117 U/L 228 (H)   Bili, Total 0.2 - 1.0 MG/DL 0.6   Bili, Direct 0.0 - 0.2 MG/DL 0.4 (H)   Albumin 3.4 - 5.0 g/dL 3.2 (L)   BUN 7.0 - 18 MG/DL 16   Creat 0.6 - 1.3 MG/DL 0.65   Na 136 - 145 mmol/L 136   K 3.5 - 5.5 mmol/L 3.4 (L)   Cl 100 - 111 mmol/L 102   CO2 21 - 32 mmol/L 27   Glucose 74 - 99 mg/dL 322 (H)     SEROLOGIES:  Serologies Latest Ref Rng & Units 3/14/2022   Hep A Ab, Total Negative   Negative   Hep B Core Ab, Total Negative   Negative   Hep B Surface Ab >10.0 mIU/mL <3.10 (L)   Hep B Surface Ab Interp POS   Negative (A)   Hep C Genotype  1a       LIVER HISTOLOGY:  05/2022. FibroScan performed at [facility]. EkPa was 20.1. IQR/med 8%. . The results suggested a fibrosis level of F4. The CAP score suggests there is no significant hepatic steatosis. 01/2023. FibroScan performed at [facility]. EkPa was 14.8. IQR/med 8%. . The results suggested a fibrosis level of F3/F4. The CAP score suggests there is no significant hepatic steatosis. ENDOSCOPIC PROCEDURES:  Not available or performed    RADIOLOGY:  03/2022. Ultrasound of the liver. Slightly coarsened echotexture. No focal lesions are seen within limits of study. 009/2022. Ultrasound of the liver. Coarsened hepatic echotexture. Cirrhosis. No focal hepatic lesion. OTHER TESTING:  Not available or performed    FOLLOW-UP:  All of the issues listed above in the Assessment and Plan were discussed with the patient. All questions were answered. The patient expressed a clear understanding of the above. [address] in 6 months.         LOLLY Obrien-SERG  Liver [place] of [address], [address] [place] [address] Adelaida Quintero, [address] [place] [address]   [phone]

## 2023-01-12 LAB
AFP L3 MFR SERPL: NORMAL % (ref 0–9.9)
AFP SERPL-MCNC: 5 NG/ML (ref 0–9.2)

## 2023-02-01 DIAGNOSIS — B18.2 CHRONIC HEPATITIS C WITHOUT HEPATIC COMA (HCC): Primary | ICD-10-CM

## 2023-02-04 DIAGNOSIS — B18.2 CHRONIC HEPATITIS C WITHOUT HEPATIC COMA (HCC): Primary | ICD-10-CM

## 2023-07-11 ENCOUNTER — TELEPHONE (OUTPATIENT)
Age: 68
End: 2023-07-11

## 2023-07-11 NOTE — TELEPHONE ENCOUNTER
Returned call to patient informing her appt is scheduled for 7/17/23 at 10:05 a.m. with YAMINI Galan.

## 2023-07-17 ENCOUNTER — HOSPITAL ENCOUNTER (OUTPATIENT)
Facility: HOSPITAL | Age: 68
Setting detail: SPECIMEN
Discharge: HOME | End: 2023-07-20
Payer: MEDICARE

## 2023-07-17 ENCOUNTER — OFFICE VISIT (OUTPATIENT)
Age: 68
End: 2023-07-17
Payer: MEDICARE

## 2023-07-17 VITALS
BODY MASS INDEX: 18.77 KG/M2 | WEIGHT: 102 LBS | HEART RATE: 79 BPM | OXYGEN SATURATION: 99 % | TEMPERATURE: 97 F | SYSTOLIC BLOOD PRESSURE: 166 MMHG | DIASTOLIC BLOOD PRESSURE: 74 MMHG | HEIGHT: 62 IN

## 2023-07-17 DIAGNOSIS — Z86.19 HEPATITIS C VIRUS INFECTION CURED AFTER ANTIVIRAL DRUG THERAPY: ICD-10-CM

## 2023-07-17 DIAGNOSIS — K74.60 CIRRHOSIS OF LIVER WITHOUT ASCITES, UNSPECIFIED HEPATIC CIRRHOSIS TYPE (HCC): ICD-10-CM

## 2023-07-17 PROBLEM — B18.2 CHRONIC HEPATITIS C WITHOUT HEPATIC COMA (HCC): Status: RESOLVED | Noted: 2022-05-17 | Resolved: 2023-07-17

## 2023-07-17 LAB
ALBUMIN SERPL-MCNC: 3.6 G/DL (ref 3.4–5)
ALBUMIN/GLOB SERPL: 0.8 (ref 0.8–1.7)
ALP SERPL-CCNC: 141 U/L (ref 45–117)
ALT SERPL-CCNC: 37 U/L (ref 13–56)
ANION GAP SERPL CALC-SCNC: 7 MMOL/L (ref 3–18)
AST SERPL-CCNC: 22 U/L (ref 10–38)
BASOPHILS # BLD: 0 K/UL (ref 0–0.1)
BASOPHILS NFR BLD: 1 % (ref 0–2)
BILIRUB DIRECT SERPL-MCNC: 0.1 MG/DL (ref 0–0.2)
BILIRUB SERPL-MCNC: 0.3 MG/DL (ref 0.2–1)
BUN SERPL-MCNC: 8 MG/DL (ref 7–18)
BUN/CREAT SERPL: 12 (ref 12–20)
CALCIUM SERPL-MCNC: 8.4 MG/DL (ref 8.5–10.1)
CHLORIDE SERPL-SCNC: 107 MMOL/L (ref 100–111)
CO2 SERPL-SCNC: 25 MMOL/L (ref 21–32)
CREAT SERPL-MCNC: 0.66 MG/DL (ref 0.6–1.3)
DIFFERENTIAL METHOD BLD: ABNORMAL
EOSINOPHIL # BLD: 0 K/UL (ref 0–0.4)
EOSINOPHIL NFR BLD: 1 % (ref 0–5)
ERYTHROCYTE [DISTWIDTH] IN BLOOD BY AUTOMATED COUNT: 13.3 % (ref 11.6–14.5)
GLOBULIN SER CALC-MCNC: 4.7 G/DL (ref 2–4)
GLUCOSE SERPL-MCNC: 198 MG/DL (ref 74–99)
HCT VFR BLD AUTO: 34.1 % (ref 35–45)
HGB BLD-MCNC: 10.8 G/DL (ref 12–16)
IMM GRANULOCYTES # BLD AUTO: 0 K/UL (ref 0–0.04)
IMM GRANULOCYTES NFR BLD AUTO: 0 % (ref 0–0.5)
INR PPP: 1 (ref 0.8–1.2)
LYMPHOCYTES # BLD: 1.4 K/UL (ref 0.9–3.6)
LYMPHOCYTES NFR BLD: 35 % (ref 21–52)
MCH RBC QN AUTO: 29.1 PG (ref 24–34)
MCHC RBC AUTO-ENTMCNC: 31.7 G/DL (ref 31–37)
MCV RBC AUTO: 91.9 FL (ref 78–100)
MONOCYTES # BLD: 0.2 K/UL (ref 0.05–1.2)
MONOCYTES NFR BLD: 5 % (ref 3–10)
NEUTS SEG # BLD: 2.4 K/UL (ref 1.8–8)
NEUTS SEG NFR BLD: 58 % (ref 40–73)
NRBC # BLD: 0 K/UL (ref 0–0.01)
NRBC BLD-RTO: 0 PER 100 WBC
PLATELET # BLD AUTO: 169 K/UL (ref 135–420)
PMV BLD AUTO: 11.1 FL (ref 9.2–11.8)
POTASSIUM SERPL-SCNC: 3.6 MMOL/L (ref 3.5–5.5)
PROT SERPL-MCNC: 8.3 G/DL (ref 6.4–8.2)
PROTHROMBIN TIME: 14 SEC (ref 11.5–15.2)
RBC # BLD AUTO: 3.71 M/UL (ref 4.2–5.3)
SODIUM SERPL-SCNC: 139 MMOL/L (ref 136–145)
WBC # BLD AUTO: 4.1 K/UL (ref 4.6–13.2)

## 2023-07-17 PROCEDURE — G8400 PT W/DXA NO RESULTS DOC: HCPCS | Performed by: NURSE PRACTITIONER

## 2023-07-17 PROCEDURE — 85610 PROTHROMBIN TIME: CPT

## 2023-07-17 PROCEDURE — 36415 COLL VENOUS BLD VENIPUNCTURE: CPT

## 2023-07-17 PROCEDURE — 3077F SYST BP >= 140 MM HG: CPT | Performed by: NURSE PRACTITIONER

## 2023-07-17 PROCEDURE — 4004F PT TOBACCO SCREEN RCVD TLK: CPT | Performed by: NURSE PRACTITIONER

## 2023-07-17 PROCEDURE — 87522 HEPATITIS C REVRS TRNSCRPJ: CPT

## 2023-07-17 PROCEDURE — 82107 ALPHA-FETOPROTEIN L3: CPT

## 2023-07-17 PROCEDURE — G8427 DOCREV CUR MEDS BY ELIG CLIN: HCPCS | Performed by: NURSE PRACTITIONER

## 2023-07-17 PROCEDURE — 1090F PRES/ABSN URINE INCON ASSESS: CPT | Performed by: NURSE PRACTITIONER

## 2023-07-17 PROCEDURE — 99214 OFFICE O/P EST MOD 30 MIN: CPT | Performed by: NURSE PRACTITIONER

## 2023-07-17 PROCEDURE — 1123F ACP DISCUSS/DSCN MKR DOCD: CPT | Performed by: NURSE PRACTITIONER

## 2023-07-17 PROCEDURE — G8420 CALC BMI NORM PARAMETERS: HCPCS | Performed by: NURSE PRACTITIONER

## 2023-07-17 PROCEDURE — 80048 BASIC METABOLIC PNL TOTAL CA: CPT

## 2023-07-17 PROCEDURE — 3078F DIAST BP <80 MM HG: CPT | Performed by: NURSE PRACTITIONER

## 2023-07-17 PROCEDURE — 86803 HEPATITIS C AB TEST: CPT

## 2023-07-17 PROCEDURE — 3017F COLORECTAL CA SCREEN DOC REV: CPT | Performed by: NURSE PRACTITIONER

## 2023-07-17 PROCEDURE — 80076 HEPATIC FUNCTION PANEL: CPT

## 2023-07-17 PROCEDURE — 85025 COMPLETE CBC W/AUTO DIFF WBC: CPT

## 2023-07-17 ASSESSMENT — PATIENT HEALTH QUESTIONNAIRE - PHQ9
SUM OF ALL RESPONSES TO PHQ QUESTIONS 1-9: 0
SUM OF ALL RESPONSES TO PHQ9 QUESTIONS 1 & 2: 0
1. LITTLE INTEREST OR PLEASURE IN DOING THINGS: 0
2. FEELING DOWN, DEPRESSED OR HOPELESS: 0
SUM OF ALL RESPONSES TO PHQ QUESTIONS 1-9: 0

## 2023-07-17 NOTE — PROGRESS NOTES
(L)   HGB 12.0 - 16.0 g/dL 13.5    - 420 K/uL 232   INR 0.8 - 1.2      AST 10 - 38 U/L 79 (H)   ALT 13 - 56 U/L 135 (H)   Alk Phos 45 - 117 U/L 228 (H)   Bili, Total 0.2 - 1.0 MG/DL 0.6   Bili, Direct 0.0 - 0.2 MG/DL 0.4 (H)   Albumin 3.4 - 5.0 g/dL 3.2 (L)   BUN 7.0 - 18 MG/DL 16   Creat 0.6 - 1.3 MG/DL 0.65   Na 136 - 145 mmol/L 136   K 3.5 - 5.5 mmol/L 3.4 (L)   Cl 100 - 111 mmol/L 102   CO2 21 - 32 mmol/L 27   Glucose 74 - 99 mg/dL 322 (H)     Cancer Screening Latest Ref Rng & Units 1/11/2023 8/11/2022 3/14/2022   AFP 0.0 - 9.2 ng/mL 5.0 6.3 10.6 (H)   AFP-L3% 0.0 - 9.9 % Comment Comment 3.5     SEROLOGIES:  Serologies Latest Ref Rng & Units 3/14/2022   Hep A Ab, Total Negative   Negative   Hep B Core Ab, Total Negative   Negative   Hep B Surface Ab >10.0 mIU/mL <3.10 (L)   Hep B Surface Ab Interp POS   Negative (A)   Hep C Genotype  1a       LIVER HISTOLOGY:  05/2022. Fib[facility] performed at [facility]. EkPa was 20.1. IQR/med 8%. . The results suggested a fibrosis level of F4. The CAP score suggests there is no significant hepatic steatosis. 01/2023. FibroScan performed at [facility]. EkPa was 14.8. IQR/med 8%. . The results suggested a fibrosis level of F3/F4. The CAP score suggests there is no significant hepatic steatosis. ENDOSCOPIC PROCEDURES:  Not available or performed    RADIOLOGY:  03/2022. Ultrasound of the liver. Slightly coarsened echotexture. No focal lesions are seen within limits of study. 009/2022. Ultrasound of the liver. Coarsened hepatic echotexture. Cirrhosis. No focal hepatic lesion. OTHER TESTING:  Not available or performed    FOLLOW-UP:  All of the issues listed above in the Assessment and Plan were discussed with the patient. All questions were answered. The patient expressed a clear understanding of the above.     Sheilaew in 6 months for fibroscan and continued

## 2023-07-18 LAB
HCV IGG SERPL QL IA: REACTIVE S/CO RATIO
HCV RNA SERPL NAA+PROBE-ACNC: NORMAL IU/ML
INTERPRETATION, OPI7M: NORMAL
REF LAB TEST REF RANGE: NORMAL

## 2023-07-20 LAB
AFP L3 MFR SERPL: NORMAL % (ref 0–9.9)
AFP SERPL-MCNC: 3.9 NG/ML (ref 0–9.2)

## 2023-07-31 ENCOUNTER — TELEPHONE (OUTPATIENT)
Age: 68
End: 2023-07-31

## 2023-07-31 NOTE — TELEPHONE ENCOUNTER
Patient called and would like to be contacted to discuss her lab results from 7/17/23.  Please advise

## 2024-05-02 ENCOUNTER — HOSPITAL ENCOUNTER (OUTPATIENT)
Facility: HOSPITAL | Age: 69
Setting detail: SPECIMEN
Discharge: HOME | End: 2024-05-02
Payer: MEDICARE

## 2024-05-02 ENCOUNTER — OFFICE VISIT (OUTPATIENT)
Age: 69
End: 2024-05-02
Payer: MEDICARE

## 2024-05-02 VITALS
DIASTOLIC BLOOD PRESSURE: 74 MMHG | HEIGHT: 62 IN | WEIGHT: 108 LBS | BODY MASS INDEX: 19.88 KG/M2 | TEMPERATURE: 97.1 F | SYSTOLIC BLOOD PRESSURE: 157 MMHG | RESPIRATION RATE: 16 BRPM | OXYGEN SATURATION: 99 % | HEART RATE: 82 BPM

## 2024-05-02 DIAGNOSIS — Z86.19 HEPATITIS C VIRUS INFECTION CURED AFTER ANTIVIRAL DRUG THERAPY: ICD-10-CM

## 2024-05-02 DIAGNOSIS — Z86.19 HEPATITIS C VIRUS INFECTION CURED AFTER ANTIVIRAL DRUG THERAPY: Primary | ICD-10-CM

## 2024-05-02 LAB
ALBUMIN SERPL-MCNC: 3.5 G/DL (ref 3.4–5)
ALBUMIN/GLOB SERPL: 0.7 (ref 0.8–1.7)
ALP SERPL-CCNC: 136 U/L (ref 45–117)
ALT SERPL-CCNC: 36 U/L (ref 13–56)
ANION GAP SERPL CALC-SCNC: 8 MMOL/L (ref 3–18)
AST SERPL-CCNC: 29 U/L (ref 10–38)
BASOPHILS # BLD: 0 K/UL (ref 0–0.1)
BASOPHILS NFR BLD: 1 % (ref 0–2)
BILIRUB DIRECT SERPL-MCNC: 0.1 MG/DL (ref 0–0.2)
BILIRUB SERPL-MCNC: 0.2 MG/DL (ref 0.2–1)
BUN SERPL-MCNC: 9 MG/DL (ref 7–18)
BUN/CREAT SERPL: 12 (ref 12–20)
CALCIUM SERPL-MCNC: 8.8 MG/DL (ref 8.5–10.1)
CHLORIDE SERPL-SCNC: 105 MMOL/L (ref 100–111)
CO2 SERPL-SCNC: 24 MMOL/L (ref 21–32)
CREAT SERPL-MCNC: 0.78 MG/DL (ref 0.6–1.3)
DIFFERENTIAL METHOD BLD: ABNORMAL
EOSINOPHIL # BLD: 0 K/UL (ref 0–0.4)
EOSINOPHIL NFR BLD: 1 % (ref 0–5)
ERYTHROCYTE [DISTWIDTH] IN BLOOD BY AUTOMATED COUNT: 14 % (ref 11.6–14.5)
GLOBULIN SER CALC-MCNC: 4.7 G/DL (ref 2–4)
GLUCOSE SERPL-MCNC: 208 MG/DL (ref 74–99)
HCT VFR BLD AUTO: 35.3 % (ref 35–45)
HGB BLD-MCNC: 11.3 G/DL (ref 12–16)
IMM GRANULOCYTES # BLD AUTO: 0 K/UL (ref 0–0.04)
IMM GRANULOCYTES NFR BLD AUTO: 0 % (ref 0–0.5)
INR PPP: 1 (ref 0.9–1.1)
LYMPHOCYTES # BLD: 1.7 K/UL (ref 0.9–3.6)
LYMPHOCYTES NFR BLD: 29 % (ref 21–52)
MCH RBC QN AUTO: 28.1 PG (ref 24–34)
MCHC RBC AUTO-ENTMCNC: 32 G/DL (ref 31–37)
MCV RBC AUTO: 87.8 FL (ref 78–100)
MONOCYTES # BLD: 0.3 K/UL (ref 0.05–1.2)
MONOCYTES NFR BLD: 4 % (ref 3–10)
NEUTS SEG # BLD: 3.7 K/UL (ref 1.8–8)
NEUTS SEG NFR BLD: 65 % (ref 40–73)
NRBC # BLD: 0 K/UL (ref 0–0.01)
NRBC BLD-RTO: 0 PER 100 WBC
PLATELET # BLD AUTO: 192 K/UL (ref 135–420)
PMV BLD AUTO: 9.8 FL (ref 9.2–11.8)
POTASSIUM SERPL-SCNC: 3.9 MMOL/L (ref 3.5–5.5)
PROT SERPL-MCNC: 8.2 G/DL (ref 6.4–8.2)
PROTHROMBIN TIME: 13.8 SEC (ref 11.9–14.7)
RBC # BLD AUTO: 4.02 M/UL (ref 4.2–5.3)
SODIUM SERPL-SCNC: 137 MMOL/L (ref 136–145)
WBC # BLD AUTO: 5.7 K/UL (ref 4.6–13.2)

## 2024-05-02 PROCEDURE — 36415 COLL VENOUS BLD VENIPUNCTURE: CPT

## 2024-05-02 PROCEDURE — 82107 ALPHA-FETOPROTEIN L3: CPT

## 2024-05-02 PROCEDURE — 87522 HEPATITIS C REVRS TRNSCRPJ: CPT

## 2024-05-02 PROCEDURE — 80076 HEPATIC FUNCTION PANEL: CPT

## 2024-05-02 PROCEDURE — 85025 COMPLETE CBC W/AUTO DIFF WBC: CPT

## 2024-05-02 PROCEDURE — 80048 BASIC METABOLIC PNL TOTAL CA: CPT

## 2024-05-02 PROCEDURE — 91200 LIVER ELASTOGRAPHY: CPT | Performed by: NURSE PRACTITIONER

## 2024-05-02 PROCEDURE — 85610 PROTHROMBIN TIME: CPT

## 2024-05-02 RX ORDER — LIDOCAINE 50 MG/G
PATCH TOPICAL
COMMUNITY
Start: 2024-01-13

## 2024-05-02 NOTE — PROGRESS NOTES
as indicated.  Annual flu vaccination should be administered if indicated.    ALLERGIES  No Known Allergies    MEDICATIONS  Current Outpatient Medications   Medication Sig    rosuvastatin (CRESTOR) 20 mg tablet Take 20 mg by mouth nightly.    insulin glargine (Lantus U-100 Insulin) 100 unit/mL injection by SubCUTAneous route nightly.    insulin lispro (HUMALOG) 100 unit/mL injection by SubCUTAneous route.    lisinopriL (PRINIVIL, ZESTRIL) 5 mg tablet Take  by mouth daily.     No current facility-administered medications for this visit.       SYSTEM REVIEW NOT RELATED TO LIVER DISEASE OR REVIEWED ABOVE:  Constitution systems: Negative for fever, chills, weight gain, weight loss.   Eyes: Negative for visual changes.  ENT: Negative for sore throat, painful swallowing.   Respiratory: Negative for cough, hemoptysis, SOB.   Cardiology: Negative for chest pain, palpitations.  GI:  Negative for constipation or diarrhea.  : Negative for urinary frequency, dysuria, hematuria, nocturia.   Skin: Negative for rash.  Hematology: Negative for easy bruising, blood clots.    Musculo-skelatal: Negative for back pain, muscle pain, weakness.  Neurologic: Negative for headaches, dizziness, vertigo, memory problems not related to HE.  Psychology: Negative for anxiety, depression.     FAMILY HISTORY:  The father   of \"he just got sick all of the sudden.  He  suddenly\".  He was about 87.     The mother Has/had the following chronic disease(s): DM, HTN, blindness.  She is 92 y/o.   There is no family history of liver disease.    There is no family history of immune disorders.    SOCIAL HISTORY:  The patient is .     The spouse has not been tested for HCV   The patient has 2 children and 5 grandchildren.    The patient has never used tobacco products.    The patient consumes alcohol on social occasions never in excess.    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      PHYSICAL EXAMINATION:  BP

## 2024-05-03 LAB
AFP L3 MFR SERPL: NORMAL % (ref 0–9.9)
AFP SERPL-MCNC: 3.6 NG/ML (ref 0–9.2)
HCV RNA SERPL NAA+PROBE-LOG IU: NOT DETECTED HCV LOG 10IU/ML
HCV RNA SERPL PROBE AMP-ACNC: NOT DETECTED HCVIU/ML

## 2025-04-01 ENCOUNTER — TELEPHONE (OUTPATIENT)
Age: 70
End: 2025-04-01